# Patient Record
Sex: FEMALE | Race: WHITE
[De-identification: names, ages, dates, MRNs, and addresses within clinical notes are randomized per-mention and may not be internally consistent; named-entity substitution may affect disease eponyms.]

---

## 2018-06-01 ENCOUNTER — HOSPITAL ENCOUNTER (OUTPATIENT)
Dept: HOSPITAL 76 - DI | Age: 43
Discharge: HOME | End: 2018-06-01
Attending: STUDENT IN AN ORGANIZED HEALTH CARE EDUCATION/TRAINING PROGRAM
Payer: COMMERCIAL

## 2018-06-01 DIAGNOSIS — R90.89: Primary | ICD-10-CM

## 2018-06-01 DIAGNOSIS — J32.9: ICD-10-CM

## 2018-06-01 PROCEDURE — 70553 MRI BRAIN STEM W/O & W/DYE: CPT

## 2018-06-01 NOTE — MRI REPORT
EXAM:

MRI BRAIN WITHOUT AND WITH CONTRAST

 

EXAM DATE: 6/1/2018 09:54 AM.

 

CLINICAL HISTORY: History of acromegaly and previous surgery to treat pituitary tumor.

 

COMPARISON: 04/10/2015.

 

TECHNIQUE: Multiplanar, multisequence T1-weighted and fluid-sensitive MR sequences of the brain were 
performed. Sequences optimized for brain and pituitary evaluation. Other: None. IV Contrast: Without 
and with 11.5 mL Gadavist.

 

FINDINGS:

Brain Volume: Normal for age.

 

Parenchyma: No acute hemorrhage, mass, or infarct. No white matter lesions identified. No abnormal en
hancement.

 

Ventricles/Cisterns: No hydrocephalus. No abnormal extra-axial fluid collection or hemorrhage.

 

Orbits: Symmetric and unremarkable.

 

Sella Turcica: There are no findings of interval surgery for pituitary mass resection. There is now a
 fluid-containing resection defect to the left of midline where tumor was previously identified. Ther
e is now a small amount of enhancing tissue along the right lateral and posterior margin of the sella
 which likely represents residual pituitary tissue. Significantly more normal position and appearance
 of the pituitary infundibulum, now only minimally deviated to the right of midline and inserting inf
eriorly on to the remaining enhancing tissue that likely represents pituitary. There is a small amoun
t of residual enhancing tissue at the inferolateral margin of the left pituitary fossa which shows ch
ronic expansion, this is nonspecific in the region of previous surgery and could represent a small am
ount of residual tumor. There is no longer extension of disease into the suprasellar cistern. Unremar
kable contours of the optic chiasm. The cavernous carotid flow voids are maintained. Symmetric unrema
rkable appearance of Meckel's caves. No evidence for new or enlarging mass in the region of the pitui
tary or left cavernous sinus.

 

IAC: Symmetric and unremarkable.

 

Vasculature: Normal signal flow void is seen in the major arterial structures at the skull base. The 
dural sinuses are patent and enhance normally.

 

Sinuses: Mild to moderate ethmoid and right maxillary sinus mucosal thickening.

 

Bones: Stable expanded appearance of the bony sella. No focal pathologic appearing marrow signal andres
ges in the calvarium or clivus.

 

Other: None.

 

IMPRESSION: 

1. There are now findings of interval surgery with left of midline pituitary mass resection.

2. A small amount of amorphous perioperative pituitary fossa tissue enhancement is present which may 
be postoperative changes, small residual tumor or both, MRI follow-up can be used to determine stabil
ity.

3. Enhancing tissue in the pituitary fossa posteriorly and to the right of midline likely represents 
residual pituitary gland. Significantly reduced deformity of the pituitary infundibulum.

4. Sinusitis.

5. Intracranial MRI findings otherwise appear stable.

 

[facility]

Referring Provider Line: [phone]

 

SITE ID: [id number]

## 2019-10-14 ENCOUNTER — HOSPITAL ENCOUNTER (OUTPATIENT)
Dept: HOSPITAL 76 - DI.S | Age: 44
Discharge: HOME | End: 2019-10-14
Attending: PHYSICIAN ASSISTANT
Payer: COMMERCIAL

## 2019-10-14 DIAGNOSIS — Q76.49: ICD-10-CM

## 2019-10-14 DIAGNOSIS — M16.0: ICD-10-CM

## 2019-10-14 DIAGNOSIS — M54.5: Primary | ICD-10-CM

## 2019-10-14 PROCEDURE — 72100 X-RAY EXAM L-S SPINE 2/3 VWS: CPT

## 2019-10-14 NOTE — XRAY REPORT
Reason:  LOW BACK AND HIP PAIN

Procedure Date:  10/14/2019   

Accession Number:  [id number] / [id number]                    

Procedure:  XRS - Lumbar Spine 2 View CPT Code:  

 

FULL RESULT:

 

 

EXAM:

LUMBOSACRAL SPINE RADIOGRAPHY

 

EXAM DATE: 10/14/2019 09:38 AM.

 

CLINICAL HISTORY: LOW BACK AND HIP PAIN.

 

COMPARISONS: None.

 

TECHNIQUE: 3 views.

 

FINDINGS:

Alignment: Normal. No spondylolisthesis or scoliosis.

 

Bones: Five non-rib-bearing lumbar vertebral bodies are present with 

additional partial lumbarization of S1. No fractures or bone lesions.

 

Disks: Normal. Disk heights are maintained.

 

Facets: No degenerative changes.

 

Sacroiliac Joints: Unremarkable.

 

Soft Tissues: Normal. The visualized bowel gas pattern is normal.

IMPRESSION: Partial lumbarization of S1.

 

[facility]

## 2019-10-14 NOTE — XRAY REPORT
Reason:  HIP PAIN

Procedure Date:  10/14/2019   

Accession Number:  [id number] / [id number]                    

Procedure:  XRS - Hip w/Pelvis 2-3V RT CPT Code:  

 

FULL RESULT:

 

 

EXAM:

RIGHT HIP RADIOGRAPHY

 

EXAM DATE: 10/14/2019 09:38 AM.

 

CLINICAL HISTORY: HIP PAIN.

 

COMPARISON: LUMBAR SPINE 2 VIEW 10/14/2019 9:34 AM.

 

TECHNIQUE: 2 views.

 

FINDINGS:

Bones: Normal. No fractures or bone lesion.

 

Joints: There is mild joint space narrowing with osteophytosis, 

osteoarthrosis. These changes are greater in the right hip when compared 

to the left. Pubic symphysis demonstrates degenerative changes. 

Sacroiliac joints are congruent. No dislocation.

 

Soft Tissues: Normal. No soft tissue swelling.

IMPRESSION: Hip osteoarthrosis.

 

[facility]

## 2021-07-13 ENCOUNTER — HOSPITAL ENCOUNTER (OUTPATIENT)
Dept: HOSPITAL 76 - LAB.S | Age: 46
Discharge: HOME | End: 2021-07-13
Attending: EMERGENCY MEDICINE
Payer: COMMERCIAL

## 2021-07-13 DIAGNOSIS — R30.0: Primary | ICD-10-CM

## 2021-07-13 LAB
CLARITY UR REFRACT.AUTO: CLEAR
GLUCOSE UR QL STRIP.AUTO: NEGATIVE MG/DL
KETONES UR QL STRIP.AUTO: NEGATIVE MG/DL
NITRITE UR QL STRIP.AUTO: NEGATIVE
PH UR STRIP.AUTO: 5.5 PH (ref 5–7.5)
PROT UR STRIP.AUTO-MCNC: NEGATIVE MG/DL
RBC # UR STRIP.AUTO: NEGATIVE /UL
SP GR UR STRIP.AUTO: 1.02 (ref 1–1.03)
SQUAMOUS URNS QL MICRO: (no result)
UROBILINOGEN UR QL STRIP.AUTO: (no result) E.U./DL
UROBILINOGEN UR STRIP.AUTO-MCNC: NEGATIVE MG/DL
WBC # UR MANUAL: (no result) /HPF (ref 0–5)

## 2021-07-13 PROCEDURE — 87086 URINE CULTURE/COLONY COUNT: CPT

## 2021-07-13 PROCEDURE — 81001 URINALYSIS AUTO W/SCOPE: CPT

## 2021-10-05 ENCOUNTER — HOSPITAL ENCOUNTER (OUTPATIENT)
Dept: HOSPITAL 76 - DI.S | Age: 46
Discharge: HOME | End: 2021-10-05
Attending: PHYSICIAN ASSISTANT
Payer: COMMERCIAL

## 2021-10-05 DIAGNOSIS — M47.817: ICD-10-CM

## 2021-10-05 DIAGNOSIS — M47.816: Primary | ICD-10-CM

## 2021-10-05 NOTE — XRAY REPORT
PROCEDURE:  Lumbar Spine 2 View

 

INDICATIONS:  LOW BACK PAIN

 

TECHNIQUE:  3 views of the lumbar spine were acquired.  

 

COMPARISON:  10/14/2019. Correlation is made with abdomen pelvis CT, 11/20/2016.

 

FINDINGS:  

 

Bones:  5 non-rib-bearing vertebrae are present.  There is normal bony alignment.  No vertebral body 
compression fractures.  No suspicious bony lesions.  

 

Transitional lumbar anatomy is again seen, with partial lumbarization of the S1 level, particularly o
n the left side.

 

The disc heights are well preserved. There is a transitional, rudimentary disc seen at the S1-S2 leve
l. Mild disc space narrowing is seen at L5-S1.  Facet arthropathy is seen, which is most prominent in
feriorly.  

 

Soft tissues:  Overlying bowel gas pattern is normal.  No suspicious soft tissue calcifications.  

 

 

 

IMPRESSION:  

 

Lower lumbar spine degenerative changes are seen.

 

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 
at the S1-S2 level.

 

Reviewed by: Nando Almanzar MD on 10/5/2021 4:33 PM [facility]

Approved by: Nando Almanzar MD on 10/5/2021 4:33 PM [facility]

 

 

Station ID:  [id number]

## 2022-12-12 ENCOUNTER — HOSPITAL ENCOUNTER (OUTPATIENT)
Dept: HOSPITAL 76 - DI | Age: 47
Discharge: HOME | End: 2022-12-12
Attending: OBSTETRICS & GYNECOLOGY
Payer: COMMERCIAL

## 2022-12-12 DIAGNOSIS — Z12.31: Primary | ICD-10-CM

## 2022-12-13 NOTE — MAMMOGRAPHY REPORT
BILATERAL DIGITAL SCREENING MAMMOGRAM 3D/2D: 12/12/2022

 

CLINICAL: Baseline exam. Routine screening.  

 

No prior exams were available for comparison.  

 

Both breasts are heterogeneously dense, which may obscure small masses (category c / 51-75% glandular
 tissue).  

 

 

No significant masses, calcifications, or other findings are seen in either breast.  

 

IMPRESSION: NEGATIVE

There is no mammographic evidence of malignancy. A 1 year screening mammogram is recommended.  

 

 

This exam was interpreted at Station ID: [id number].  

 

NOTE: For mammograms, a report in lay terms will be sent to the patient. Approximately 15% of breast 
malignancies will not be visualized mammographically. In the management of a palpable breast mass, a 
negative mammogram must not discourage biopsy of a clinically suspicious lesion.

 

Electronically Signed By: Elliott estevez/penrad:12/12/2022 15:07:26  

 

 

 

ACR BI-RADS Category 1: Negative 3341F

PARENCHYMAL PATTERN: (D) - The breast(s) demonstrate(s) heterogeneously dense fibroglandular mary solorio.

BI-RADS CATEGORY: (1) - 1

RECOMMENDATION: (ANNUAL)  - Recommend routine annual screening mammography.

[id number]

1 year screening

LATERALITY: (B)